# Patient Record
(demographics unavailable — no encounter records)

---

## 2024-12-12 NOTE — ASSESSMENT
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 We briefly discussed option of repeat in office injection with Restylane, in OR injection with Prolaryn Plus, or medialization thyroplasty. As he states his voice is much better subsequent to injection in office and has maintained this improvement will hold off on any of these options for now.   As head and neck exam is normal no indication for CT maxillofacial.  Small non-displaced fracture of left lateral max sinus wall will heal without surgical intervention. No indication for abx.

## 2024-12-12 NOTE — PROCEDURE
[de-identified] : Stroboscopic Laryngoscopy Procedure Note: Indication: Assess laryngeal biomechanics and vocal fold oscillation. Description of Procedure: Informed consent was verbally obtained from the patient prior to the procedure. The patient was seated in the clinic chair. Topical anesthesia was achieved by first spraying the nasal cavities with 4% lidocaine and nasal decongestant.  Findings: Supraglottis: no masses or lesions Glottis: Structure:  Right: small mid fold polyp  Left: crisp and shows no lesions or masses  Mobility:  Right: normal  Left: normal  Amplitude:  Right: increased  Left: increased  Closure: complete  Wave symmetry: symmetric Subglottis: no masses or lesions within the visualized subglottis Visualized airway is widely patent.

## 2024-12-12 NOTE — PROCEDURE
[de-identified] : Stroboscopic Laryngoscopy Procedure Note: Indication: Assess laryngeal biomechanics and vocal fold oscillation. Description of Procedure: Informed consent was verbally obtained from the patient prior to the procedure. The patient was seated in the clinic chair. Topical anesthesia was achieved by first spraying the nasal cavities with 4% lidocaine and nasal decongestant.  Findings: Supraglottis: no masses or lesions Glottis: Structure:  Right: small mid fold polyp  Left: crisp and shows no lesions or masses  Mobility:  Right: normal  Left: normal  Amplitude:  Right: increased  Left: increased  Closure: complete  Wave symmetry: symmetric Subglottis: no masses or lesions within the visualized subglottis Visualized airway is widely patent.

## 2024-12-12 NOTE — PHYSICAL EXAM
[Midline] : trachea located in midline position [Normal] : no masses and lesions seen, face is symmetric

## 2024-12-12 NOTE — HISTORY OF PRESENT ILLNESS
[de-identified] : LAVINIA TOWNSEND is a 70 year old male who presents to the [facility] for follow up of his lower motor neuron disease, glottic insufficiency- severe, dysphonia, and hx of PNA- possible aspiration. I last saw the patient on 9/12/24. Was to continue working with SLP and RTC in 3mths. Continues to see Blanca Fields for speech. Voice continues to improve, however still hoarse. No trouble swallowing or breathing.   CT head performed on 12/9/24 and reviewed by myself shows: 1. Age-appropriate involutional changes. No acute intracranial hemorrhage, mass effect, or midline shift. 2. Air-fluid level within the left maxillary sinus, possibly hemorrhage. Non-displaced fracture along the lateral wall of the left maxillary sinus.  Previously reported: difficulty phonating for 2 years. Has been worse past 6 months but since then plateaued. Had recent admission for colitis and hypercarbia resp failure secondary to PNA June 16-28th in [place]. July 28-July 4th [place] rehab. Previously did voice therapy completing on May 2023. Hx of being  during 9/11. Recent hospitalization did not require intubation. He now does not note normal periods of voicing. He does not note difficulty swallowing. He does note frequent heartburn symptoms - pantoprazole daily He does note difficulty breahting during activity - using Breo Ellipta daily and non invasive ventilator at night since hospitalization. PMH significant for lower motor neuron disease. Sees outside neurologist.  PET (8/15/24): 1. Normal gastric uptake. 2. Uptake previously seen at the bowel has resolved. 3. Stable mildly avid lingular opacity. Clusters of small RIGHT LOWER lobe lung nodules appear largely unchanged since most recent dedicated CT and are metabolically unremarkable. Attention to follow-up. 4. No new suspicious findings otherwise noted  CT chest w/o con performed on 7/22/24 shows: Resolved bilateral lower lobe opacities since 6/28/2024. Persistent clusters of small nodules in the right lower lobe, likely related to a small airways process.  Modified barium swallow performed on 6/21/24 and reviewed by myself shows: No obvious aspiration. No CP bar. No Zenkers. No significant residue.  Prior Pertinent Procedures: 9/12/24: In office bilateral injection laryngoplasty with Restylane; Dr. Champagne

## 2024-12-12 NOTE — HISTORY OF PRESENT ILLNESS
[de-identified] : LAVINIA TOWNSEND is a 70 year old male who presents to the [facility] for follow up of his lower motor neuron disease, glottic insufficiency- severe, dysphonia, and hx of PNA- possible aspiration. I last saw the patient on 9/12/24. Was to continue working with SLP and RTC in 3mths. Continues to see Blanca Fields for speech. Voice continues to improve, however still hoarse. No trouble swallowing or breathing.   CT head performed on 12/9/24 and reviewed by myself shows: 1. Age-appropriate involutional changes. No acute intracranial hemorrhage, mass effect, or midline shift. 2. Air-fluid level within the left maxillary sinus, possibly hemorrhage. Non-displaced fracture along the lateral wall of the left maxillary sinus.  Previously reported: difficulty phonating for 2 years. Has been worse past 6 months but since then plateaued. Had recent admission for colitis and hypercarbia resp failure secondary to PNA June 16-28th in [place]. July 28-July 4th [place] rehab. Previously did voice therapy completing on May 2023. Hx of being  during 9/11. Recent hospitalization did not require intubation. He now does not note normal periods of voicing. He does not note difficulty swallowing. He does note frequent heartburn symptoms - pantoprazole daily He does note difficulty breahting during activity - using Breo Ellipta daily and non invasive ventilator at night since hospitalization. PMH significant for lower motor neuron disease. Sees outside neurologist.  PET (8/15/24): 1. Normal gastric uptake. 2. Uptake previously seen at the bowel has resolved. 3. Stable mildly avid lingular opacity. Clusters of small RIGHT LOWER lobe lung nodules appear largely unchanged since most recent dedicated CT and are metabolically unremarkable. Attention to follow-up. 4. No new suspicious findings otherwise noted  CT chest w/o con performed on 7/22/24 shows: Resolved bilateral lower lobe opacities since 6/28/2024. Persistent clusters of small nodules in the right lower lobe, likely related to a small airways process.  Modified barium swallow performed on 6/21/24 and reviewed by myself shows: No obvious aspiration. No CP bar. No Zenkers. No significant residue.  Prior Pertinent Procedures: 9/12/24: In office bilateral injection laryngoplasty with Restylane; Dr. Champagne

## 2024-12-13 NOTE — HISTORY OF PRESENT ILLNESS
[FreeTextEntry8] : Patient presents to office after having suffered a mechanical fall while at home. He was evaluated at [place] [facility] with a CT scan of the head which revealed a left maxillary sinus  lateral wall fracture.  Patient saw Dr. Martinez and ENT consultation. He saw his pulmonologist this morning and because of left lateral wall pain he was sent for an x-ray He denies any cough, hemoptysis, shortness of breath.  He denies any urinary symptoms, hematuria

## 2024-12-13 NOTE — PHYSICAL EXAM
[No Acute Distress] : no acute distress [Normal Sclera/Conjunctiva] : normal sclera/conjunctiva [No Lymphadenopathy] : no lymphadenopathy [Thyroid Normal, No Nodules] : the thyroid was normal and there were no nodules present [No Abdominal Bruit] : a ~M bruit was not heard ~T in the abdomen [No Edema] : there was no peripheral edema [No Palpable Aorta] : no palpable aorta [Normal] : soft, non-tender, non-distended, no masses palpated, no HSM and normal bowel sounds [Normal Supraclavicular Nodes] : no supraclavicular lymphadenopathy [Normal Posterior Cervical Nodes] : no posterior cervical lymphadenopathy [Normal Anterior Cervical Nodes] : no anterior cervical lymphadenopathy [No CVA Tenderness] : no CVA  tenderness [No Spinal Tenderness] : no spinal tenderness [No Rash] : no rash

## 2025-03-10 NOTE — PROCEDURE
[de-identified] : Laryngoscopy: Stroboscopic Laryngoscopy Procedure Note: Indication: Assess laryngeal biomechanics and vocal fold oscillation. Description of Procedure: Informed consent was verbally obtained from the patient prior to the procedure. The patient was seated in the clinic chair. Topical anesthesia was achieved by first spraying the nasal cavities with 4% lidocaine and nasal decongestant.  Findings: Supraglottis: no masses or lesions Glottis: Structure:  Right: small mid fold polyp  Left: crisp and shows no lesions or masses  Mobility:  Right: normal  Left: normal  Amplitude:  Right: increased  Left: increased  Closure: complete  Wave symmetry: symmetric Subglottis: no masses or lesions within the visualized subglottis Visualized airway is widely patent.

## 2025-03-10 NOTE — HISTORY OF PRESENT ILLNESS
[de-identified] : LAVINIA TOWNSEND is a 70 year old male who presents to the [facility] for follow up of his lower motor neuron disease, glottic insufficiency- severe, dysphonia, small right vocal fold polyp and hx of PNA- possible aspiration. I last saw the patient on 12/12/24. Pt was to follow up in 3mths for re-evaluation. Voice has been improved, louder and stronger. 80% normal voice. No recent PNA. Has COPD, but breathing is stable. No trouble swallowing.  Have completed voice therapy.   CT head performed on 12/9/24 and reviewed by myself shows: 1. Age-appropriate involutional changes. No acute intracranial hemorrhage, mass effect, or midline shift. 2. Air-fluid level within the left maxillary sinus, possibly hemorrhage. Non-displaced fracture along the lateral wall of the left maxillary sinus.  Previously reported: difficulty phonating for 2 years. Has been worse past 6 months but since then plateaued. Had recent admission for colitis and hypercarbia resp failure secondary to PNA June 16-28th in [place]. July 28-July 4th [place] re[facility]. Previously did voice therapy completing on May 2023. Hx of being  during 9/11. Recent hospitalization did not require intubation. He now does not note normal periods of voicing. He does not note difficulty swallowing. He does note frequent heartburn symptoms - pantoprazole daily He does note difficulty breahting during activity - using Breo Ellipta daily and non invasive ventilator at night since hospitalization. PMH significant for lower motor neuron disease. Sees outside neurologist.  PET (8/15/24): 1. Normal gastric uptake. 2. Uptake previously seen at the bowel has resolved. 3. Stable mildly avid lingular opacity. Clusters of small RIGHT LOWER lobe lung nodules appear largely unchanged since most recent dedicated CT and are metabolically unremarkable. Attention to follow-up. 4. No new suspicious findings otherwise noted  CT chest w/o con performed on 7/22/24 shows: Resolved bilateral lower lobe opacities since 6/28/2024. Persistent clusters of small nodules in the right lower lobe, likely related to a small airways process.  Modified barium swallow performed on 6/21/24 and reviewed by myself shows: No obvious aspiration. No CP bar. No Zenkers. No significant residue.  Prior Pertinent Procedures: 9/12/24: In office bilateral injection laryngoplasty with Restylane; Dr. Champagne

## 2025-03-10 NOTE — PROCEDURE
[de-identified] : Laryngoscopy: Stroboscopic Laryngoscopy Procedure Note: Indication: Assess laryngeal biomechanics and vocal fold oscillation. Description of Procedure: Informed consent was verbally obtained from the patient prior to the procedure. The patient was seated in the clinic chair. Topical anesthesia was achieved by first spraying the nasal cavities with 4% lidocaine and nasal decongestant.  Findings: Supraglottis: no masses or lesions Glottis: Structure:  Right: small mid fold polyp  Left: crisp and shows no lesions or masses  Mobility:  Right: normal  Left: normal  Amplitude:  Right: increased  Left: increased  Closure: complete  Wave symmetry: symmetric Subglottis: no masses or lesions within the visualized subglottis Visualized airway is widely patent.

## 2025-03-10 NOTE — HISTORY OF PRESENT ILLNESS
[de-identified] : LAVINIA TOWNSEND is a 70 year old male who presents to the [facility] for follow up of his lower motor neuron disease, glottic insufficiency- severe, dysphonia, small right vocal fold polyp and hx of PNA- possible aspiration. I last saw the patient on 12/12/24. Pt was to follow up in 3mths for re-evaluation. Voice has been improved, louder and stronger. 80% normal voice. No recent PNA. Has COPD, but breathing is stable. No trouble swallowing.  Have completed voice therapy.   CT head performed on 12/9/24 and reviewed by myself shows: 1. Age-appropriate involutional changes. No acute intracranial hemorrhage, mass effect, or midline shift. 2. Air-fluid level within the left maxillary sinus, possibly hemorrhage. Non-displaced fracture along the lateral wall of the left maxillary sinus.  Previously reported: difficulty phonating for 2 years. Has been worse past 6 months but since then plateaued. Had recent admission for colitis and hypercarbia resp failure secondary to PNA June 16-28th in [place]. July 28-July 4th [place] re[facility]. Previously did voice therapy completing on May 2023. Hx of being  during 9/11. Recent hospitalization did not require intubation. He now does not note normal periods of voicing. He does not note difficulty swallowing. He does note frequent heartburn symptoms - pantoprazole daily He does note difficulty breahting during activity - using Breo Ellipta daily and non invasive ventilator at night since hospitalization. PMH significant for lower motor neuron disease. Sees outside neurologist.  PET (8/15/24): 1. Normal gastric uptake. 2. Uptake previously seen at the bowel has resolved. 3. Stable mildly avid lingular opacity. Clusters of small RIGHT LOWER lobe lung nodules appear largely unchanged since most recent dedicated CT and are metabolically unremarkable. Attention to follow-up. 4. No new suspicious findings otherwise noted  CT chest w/o con performed on 7/22/24 shows: Resolved bilateral lower lobe opacities since 6/28/2024. Persistent clusters of small nodules in the right lower lobe, likely related to a small airways process.  Modified barium swallow performed on 6/21/24 and reviewed by myself shows: No obvious aspiration. No CP bar. No Zenkers. No significant residue.  Prior Pertinent Procedures: 9/12/24: In office bilateral injection laryngoplasty with Restylane; Dr. Champagne

## 2025-03-10 NOTE — ASSESSMENT
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 Has been doing very well with voice thus far. Would consider possible medialization thyroplasty in the future should voice worsen again.

## 2025-03-19 NOTE — CARDIOLOGY SUMMARY
[de-identified] : NSR [de-identified] : april 2022\par  Nuclear \par  nml perfusion [de-identified] : april 2022\par  EF 60-65%\par  concentric LV remodeling [Normal] : normal [No Ischemia] : no Ischemia [___] : [unfilled] [LVEF ___%] : LVEF [unfilled]% [None] : no pulmonary hypertension [Enlarged] : enlarged LA size [Mild] : mild mitral regurgitation

## 2025-03-19 NOTE — REASON FOR VISIT
[CV Risk Factors and Non-Cardiac Disease] : CV risk factors and non-cardiac disease [Coronary Artery Disease] : coronary artery disease [Follow-Up - Clinic] : a clinic follow-up of [Dizziness] : dizziness

## 2025-03-19 NOTE — DISCUSSION/SUMMARY
[FreeTextEntry1] : This is a 70 year old man with nonobstructive peripheral vascular disease, coronary artery calcifications, and GERD who presents for a cardiac evaluation.   He was noted to have coronary calcifications  on a recent CT, and was asked to follow with me.  His LE edema has resolved.  He is now off of PO Lasix, and will stay off of it.  His blood pressure is controlled.    Prior echocardiogram and stress testing from 2022 were overall unremarkable.  We discussed the benefits of a healthy diet, regular exercise and physical activity, and weight management in detail.  He will continue N IPPV, and will follow with neurology for his lower motor neuron disease.   He will continue ASA for primary prevention-coronary calcifications.  He will follow up again in 6 months.  [EKG obtained to assist in diagnosis and management of assessed problem(s)] : EKG obtained to assist in diagnosis and management of assessed problem(s)

## 2025-03-19 NOTE — PHYSICAL EXAM
[Well Developed] : well developed [Well Nourished] : well nourished [No Acute Distress] : no acute distress [Normal Venous Pressure] : normal venous pressure [No Carotid Bruit] : no carotid bruit [Normal S1, S2] : normal S1, S2 [No Rub] : no rub [Clear Lung Fields] : clear lung fields [Good Air Entry] : good air entry [No Respiratory Distress] : no respiratory distress  [Soft] : abdomen soft [Non Tender] : non-tender [No Masses/organomegaly] : no masses/organomegaly [Normal Bowel Sounds] : normal bowel sounds [Normal Gait] : normal gait [No Cyanosis] : no cyanosis [No Clubbing] : no clubbing [No Varicosities] : no varicosities [No Rash] : no rash [No Skin Lesions] : no skin lesions [Moves all extremities] : moves all extremities [No Focal Deficits] : no focal deficits [Normal Speech] : normal speech [Alert and Oriented] : alert and oriented [Normal memory] : normal memory [de-identified] : Mild ankle edema [Normal Appearance] : normal appearance [General Appearance - In No Acute Distress] : no acute distress [Normal Conjunctiva] : the conjunctiva exhibited no abnormalities [Normal Oral Mucosa] : normal oral mucosa [Normal Jugular Venous V Waves Present] : normal jugular venous V waves present [FreeTextEntry1] : No JVD [Respiration, Rhythm And Depth] : normal respiratory rhythm and effort [Exaggerated Use Of Accessory Muscles For Inspiration] : no accessory muscle use [Auscultation Breath Sounds / Voice Sounds] : lungs were clear to auscultation bilaterally [Not Palpable] : not palpable [No Precordial Heave] : no precordial heave was noted [Normal Rate] : normal [Rhythm Regular] : regular [Normal S1] : normal S1 [Normal S2] : normal S2 [No Gallop] : no gallop heard [No Murmur] : no murmurs heard [Right Carotid Bruit] : no bruit heard over the right carotid [Left Carotid Bruit] : no bruit heard over the left carotid [2+] : left 2+ [No Abnormalities] : the abdominal aorta was not enlarged and no bruit was heard [Bruit] : no bruit heard [No Pitting Edema] : no pitting edema present [Bowel Sounds] : normal bowel sounds [Abdomen Soft] : soft [Abdomen Tenderness] : non-tender [Abnormal Walk] : normal gait [Gait - Sufficient For Exercise Testing] : the gait was sufficient for exercise testing [Nail Clubbing] : no clubbing of the fingernails [Cyanosis, Localized] : no localized cyanosis [Petechial Hemorrhages (___cm)] : no petechial hemorrhages [Skin Color & Pigmentation] : normal skin color and pigmentation [] : no rash [No Venous Stasis] : no venous stasis [Skin Lesions] : no skin lesions [Oriented To Time, Place, And Person] : oriented to person, place, and time [Affect] : the affect was normal [Mood] : the mood was normal [No Anxiety] : not feeling anxious

## 2025-03-19 NOTE — HISTORY OF PRESENT ILLNESS
[FreeTextEntry1] : This is a 70 year old man with nonobstructive peripheral vascular disease, coronary artery calcifications, and GERD who presents for a cardiac evaluation.   He was noted to have coronary calcifications  on a recent CT 2022.  He also has a lower motor neuron disease, and is following with neurology.        He was admitted to  in July if 2024.  He had colitis and hypercarbic resp failure secondary to PNA.  He was not volume overloaded, and was briefly in the ICU.   He was sent to rehab, then did home PT.  He is now going to outpatient PT.  He is using NIPPV at night to sleep.  He is now off of Lasix.  No signs or symptoms of volume overload.  No chest pain, increasing dyspnea, PND, orthopnea, dizziness, or syncope.

## 2025-06-09 NOTE — ASSESSMENT
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  I would consider in office injection laryngoplasty with prolaryn Plus should this future injection not last long.  spouse

## 2025-06-09 NOTE — PROCEDURE
[de-identified] : Stroboscopic Laryngoscopy Procedure Note:  Indication:	Assess laryngeal biomechanics and vocal fold oscillation.  Description of Procedure:	Informed consent was verbally obtained from the patient prior to the procedure. The patient was seated in the clinic chair. Topical anesthesia was achieved by first spraying the nasal cavities with 4% lidocaine and nasal decongestant.   Findings:  Supraglottis: no masses or lesions  Glottis:    Structure:                        Right: crisp and shows no lesions or masses                        Left:  crisp and shows no lesions or masses                 Mobility:                        Right:  normal                        Left:  normal                Amplitude:                        Right:  increased                       Left:  increased                Closure: incomplete                 Wave symmetry:  symmetric  Subglottis: no masses or lesions within the visualized subglottis Visualized airway is widely patent. Other: severe supraglottic squeeze on phonation

## 2025-06-09 NOTE — HISTORY OF PRESENT ILLNESS
[de-identified] :  LAVINIA TOWNSEND is a 70 year old male who presents to the [facility] for follow up of his lower motor neuron disease, glottic insufficiency- severe, dysphonia, small right vocal fold polyp and hx of PNA- possible aspiration. I last saw the patient on 3/10/25. Pt was to follow up in 3mths. Would consider medialization if voice worsened again. Voice is getting weaker. Breathing is getting worse. Sleeps with non-invasive ventilator. Feels his swallowing is getting worse. Feels weaker over the past few days as well.   CT head performed on 12/9/24 and reviewed by myself shows: 1. Age-appropriate involutional changes. No acute intracranial hemorrhage, mass effect, or midline shift. 2. Air-fluid level within the left maxillary sinus, possibly hemorrhage. Non-displaced fracture along the lateral wall of the left maxillary sinus.  Previously reported: difficulty phonating for 2 years. Has been worse past 6 months but since then plateaued. Had recent admission for colitis and hypercarbia resp failure secondary to PNA June 16-28th in [place]. July 28-July 4th [place] rehab. Previously did voice therapy completing on May 2023. Hx of being  during 9/11. Recent hospitalization did not require intubation. He now does not note normal periods of voicing. He does not note difficulty swallowing. He does note frequent heartburn symptoms - pantoprazole daily He does note difficulty breahting during activity - using Breo Ellipta daily and non invasive ventilator at night since hospitalization. PMH significant for lower motor neuron disease. Sees outside neurologist.  PET (8/15/24): 1. Normal gastric uptake. 2. Uptake previously seen at the bowel has resolved. 3. Stable mildly avid lingular opacity. Clusters of small RIGHT LOWER lobe lung nodules appear largely unchanged since most recent dedicated CT and are metabolically unremarkable. Attention to follow-up. 4. No new suspicious findings otherwise noted  CT chest w/o con performed on 7/22/24 shows: Resolved bilateral lower lobe opacities since 6/28/2024. Persistent clusters of small nodules in the right lower lobe, likely related to a small airways process.  Modified barium swallow performed on 6/21/24 and reviewed by myself shows: No obvious aspiration. No CP bar. No Zenkers. No significant residue.  Prior Pertinent Procedures: 9/12/24: In office bilateral injection laryngoplasty with Restylane; Dr. Champagne

## 2025-06-09 NOTE — ASSESSMENT
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  I would consider in office injection laryngoplasty with prolaryn Plus should this future injection not last long.

## 2025-06-09 NOTE — PROCEDURE
[de-identified] : Stroboscopic Laryngoscopy Procedure Note:  Indication:	Assess laryngeal biomechanics and vocal fold oscillation.  Description of Procedure:	Informed consent was verbally obtained from the patient prior to the procedure. The patient was seated in the clinic chair. Topical anesthesia was achieved by first spraying the nasal cavities with 4% lidocaine and nasal decongestant.   Findings:  Supraglottis: no masses or lesions  Glottis:    Structure:                        Right: crisp and shows no lesions or masses                        Left:  crisp and shows no lesions or masses                 Mobility:                        Right:  normal                        Left:  normal                Amplitude:                        Right:  increased                       Left:  increased                Closure: incomplete                 Wave symmetry:  symmetric  Subglottis: no masses or lesions within the visualized subglottis Visualized airway is widely patent. Other: severe supraglottic squeeze on phonation

## 2025-06-09 NOTE — HISTORY OF PRESENT ILLNESS
[de-identified] :  LAVINIA TOWNSEND is a 70 year old male who presents to the [facility] for follow up of his lower motor neuron disease, glottic insufficiency- severe, dysphonia, small right vocal fold polyp and hx of PNA- possible aspiration. I last saw the patient on 3/10/25. Pt was to follow up in 3mths. Would consider medialization if voice worsened again. Voice is getting weaker. Breathing is getting worse. Sleeps with non-invasive ventilator. Feels his swallowing is getting worse. Feels weaker over the past few days as well.   CT head performed on 12/9/24 and reviewed by myself shows: 1. Age-appropriate involutional changes. No acute intracranial hemorrhage, mass effect, or midline shift. 2. Air-fluid level within the left maxillary sinus, possibly hemorrhage. Non-displaced fracture along the lateral wall of the left maxillary sinus.  Previously reported: difficulty phonating for 2 years. Has been worse past 6 months but since then plateaued. Had recent admission for colitis and hypercarbia resp failure secondary to PNA June 16-28th in [place]. July 28-July 4th [place] [facility]. Previously did voice therapy completing on May 2023. Hx of being  during 9/11. Recent hospitalization did not require intubation. He now does not note normal periods of voicing. He does not note difficulty swallowing. He does note frequent heartburn symptoms - pantoprazole daily He does note difficulty breahting during activity - using Breo Ellipta daily and non invasive ventilator at night since hospitalization. PMH significant for lower motor neuron disease. Sees outside neurologist.  PET (8/15/24): 1. Normal gastric uptake. 2. Uptake previously seen at the bowel has resolved. 3. Stable mildly avid lingular opacity. Clusters of small RIGHT LOWER lobe lung nodules appear largely unchanged since most recent dedicated CT and are metabolically unremarkable. Attention to follow-up. 4. No new suspicious findings otherwise noted  CT chest w/o con performed on 7/22/24 shows: Resolved bilateral lower lobe opacities since 6/28/2024. Persistent clusters of small nodules in the right lower lobe, likely related to a small airways process.  Modified barium swallow performed on 6/21/24 and reviewed by myself shows: No obvious aspiration. No CP bar. No Zenkers. No significant residue.  Prior Pertinent Procedures: 9/12/24: In office bilateral injection laryngoplasty with Restylane; Dr. Champagne

## 2025-06-19 NOTE — ASSESSMENT
[FreeTextEntry1] : Assessment/Plan: #1 Lower motor neuron disease #2 Glottic insufficiency #3 Dysphonia #4 History of PNA- possible aspiration  He was again offered the followin) Observation 2) In office injection laryngoplasty with Restylane 3) In office injection laryngoplasty with Prolaryn Plus 4) Bilateral medialization thyroplasty  He elected for option #2 as his last injection lasted for 9 months and we performed this today. I would consider in office injection laryngoplasty with prolaryn Plus should this future injection not last long.  I would like to see him back in 3 months.

## 2025-06-19 NOTE — HISTORY OF PRESENT ILLNESS
[de-identified] :  LAVINIA TOWNSEND is a 70 year old male who presents to the [facility] for follow up of his lower motor neuron disease, glottic insufficiency- severe, dysphonia and hx of PNA- possible aspiration. I last saw the patient on 6/9/25. Pt returns for injection laryngoplasty. States voice significantly improved after the last injection- lasting for about 6 months.  Raspy, whisper voice has returned for the past 2 weeks  Tolerating regular diet with no s/s of aspiration. No recent PNA  Denies recent infections, globus sensation, pain while voicing, cough, pain while swallowing, neck swelling, neck pain and complete loss of voice   CT head performed on 12/9/24 and reviewed by myself shows: 1. Age-appropriate involutional changes. No acute intracranial hemorrhage, mass effect, or midline shift. 2. Air-fluid level within the left maxillary sinus, possibly hemorrhage. Non-displaced fracture along the lateral wall of the left maxillary sinus.  Previously reported: difficulty phonating for 2 years. Has been worse past 6 months but since then plateaued. Had recent admission for colitis and hypercarbia resp failure secondary to PNA June 16-28th in [place]. July 28-July 4th [place] [facility]. Previously did voice therapy completing on May 2023. Hx of being  during 9/11. Recent hospitalization did not require intubation. He now does not note normal periods of voicing. He does not note difficulty swallowing. He does note frequent heartburn symptoms - pantoprazole daily He does note difficulty breahting during activity - using Breo Ellipta daily and non invasive ventilator at night since hospitalization. PMH significant for lower motor neuron disease. Sees outside neurologist.  PET (8/15/24): 1. Normal gastric uptake. 2. Uptake previously seen at the bowel has resolved. 3. Stable mildly avid lingular opacity. Clusters of small RIGHT LOWER lobe lung nodules appear largely unchanged since most recent dedicated CT and are metabolically unremarkable. Attention to follow-up. 4. No new suspicious findings otherwise noted  CT chest w/o con performed on 7/22/24 shows: Resolved bilateral lower lobe opacities since 6/28/2024. Persistent clusters of small nodules in the right lower lobe, likely related to a small airways process.  Modified barium swallow performed on 6/21/24 and reviewed by myself shows: No obvious aspiration. No CP bar. No Zenkers. No significant residue.  Prior Pertinent Procedures: 9/12/24: In office bilateral injection laryngoplasty with Restylane; Dr. Champagne 6/19/25:  In office bilateral injection laryngoplasty with Restylane; Dr. Champagne

## 2025-06-19 NOTE — HISTORY OF PRESENT ILLNESS
[de-identified] :  LAVINIA TOWNSEND is a 70 year old male who presents to the [facility] for follow up of his lower motor neuron disease, glottic insufficiency- severe, dysphonia and hx of PNA- possible aspiration. I last saw the patient on 6/9/25. Pt returns for injection laryngoplasty. States voice significantly improved after the last injection- lasting for about 6 months.  Raspy, whisper voice has returned for the past 2 weeks  Tolerating regular diet with no s/s of aspiration. No recent PNA  Denies recent infections, globus sensation, pain while voicing, cough, pain while swallowing, neck swelling, neck pain and complete loss of voice   CT head performed on 12/9/24 and reviewed by myself shows: 1. Age-appropriate involutional changes. No acute intracranial hemorrhage, mass effect, or midline shift. 2. Air-fluid level within the left maxillary sinus, possibly hemorrhage. Non-displaced fracture along the lateral wall of the left maxillary sinus.  Previously reported: difficulty phonating for 2 years. Has been worse past 6 months but since then plateaued. Had recent admission for colitis and hypercarbia resp failure secondary to PNA June 16-28th in [place]. July 28-July 4th [facility]. Previously did voice therapy completing on May 2023. Hx of being  during 9/11. Recent hospitalization did not require intubation. He now does not note normal periods of voicing. He does not note difficulty swallowing. He does note frequent heartburn symptoms - pantoprazole daily He does note difficulty breahting during activity - using Breo Ellipta daily and non invasive ventilator at night since hospitalization. PMH significant for lower motor neuron disease. Sees outside neurologist.  PET (8/15/24): 1. Normal gastric uptake. 2. Uptake previously seen at the bowel has resolved. 3. Stable mildly avid lingular opacity. Clusters of small RIGHT LOWER lobe lung nodules appear largely unchanged since most recent dedicated CT and are metabolically unremarkable. Attention to follow-up. 4. No new suspicious findings otherwise noted  CT chest w/o con performed on 7/22/24 shows: Resolved bilateral lower lobe opacities since 6/28/2024. Persistent clusters of small nodules in the right lower lobe, likely related to a small airways process.  Modified barium swallow performed on 6/21/24 and reviewed by myself shows: No obvious aspiration. No CP bar. No Zenkers. No significant residue.  Prior Pertinent Procedures: 9/12/24: In office bilateral injection laryngoplasty with Restylane; Dr. Champagne 6/19/25:  In office bilateral injection laryngoplasty with Restylane; Dr. Champagne

## 2025-06-19 NOTE — PROCEDURE
[FreeTextEntry3] : Restylane injection of bilateral vocal folds: SURGEON: Phillip Champagne MD   Substance Injected: Restylane   Amounts and Location Injected: 1.2 ml injected into the bilateral paraglottic space   FINDINGS: Glottic insufficiency   NARRATIVE: The patient was brought to clinic and consented for the procedure. Their nose was topicalized with oxymetazoline nasal spray and 2% lidocaine.   After topicalization, a surgical pause was held to confirm correct patient, procedure, site, allergies, equipment, and position. Using a 27-gauge needle, approximately 2cc of 2% lidocaine in 1:100,000 epinephrine was injected subcutaneously at the thyroid prominence.   A flexible distal-chip video laryngoscope was introduced into the right nare. The scope was positioned above the epiglottis. Approximately 4ml of 4% lidocaine was dripped onto the vocal cords through the working channel of the scope. This produced a strong cough, anesthetizing the vocal folds. After allowing several minutes for analgesia, the 1 ml of Restylane on a 25-gauge needle (that was bent appropriately) was inserted into the skin that had previously been anesthetized just superior to the thyroid notch. The tip of the needle was seen tenting up the mucosa of the petiole just above the anterior commissure. The needle then pierced through this mucosa and was advanced and inserted laterally into the posterior 1/3 of the bilateral vocal folds. The patient was asked to phonate, and they had a squeezed quality as aimed for. The vocal fold had been pushed slightly past medial position as intended. The needle and flexible laryngoscopes were removed. The patient tolerated the procedure well.   DISPOSITION: The patient was discharged with instructions not to eat or drink for about 60 minutes to allow the topical anesthesia to wear off.

## 2025-06-19 NOTE — HISTORY OF PRESENT ILLNESS
[de-identified] :  LAVINIA TOWNSEND is a 70 year old male who presents to the [facility] for follow up of his lower motor neuron disease, glottic insufficiency- severe, dysphonia and hx of PNA- possible aspiration. I last saw the patient on 6/9/25. Pt returns for injection laryngoplasty. States voice significantly improved after the last injection- lasting for about 6 months.  Raspy, whisper voice has returned for the past 2 weeks  Tolerating regular diet with no s/s of aspiration. No recent PNA  Denies recent infections, globus sensation, pain while voicing, cough, pain while swallowing, neck swelling, neck pain and complete loss of voice   CT head performed on 12/9/24 and reviewed by myself shows: 1. Age-appropriate involutional changes. No acute intracranial hemorrhage, mass effect, or midline shift. 2. Air-fluid level within the left maxillary sinus, possibly hemorrhage. Non-displaced fracture along the lateral wall of the left maxillary sinus.  Previously reported: difficulty phonating for 2 years. Has been worse past 6 months but since then plateaued. Had recent admission for colitis and hypercarbia resp failure secondary to PNA June 16-28th in [place]. July 28-July 4th [place] [facility]. Previously did voice therapy completing on May 2023. Hx of being  during 9/11. Recent hospitalization did not require intubation. He now does not note normal periods of voicing. He does not note difficulty swallowing. He does note frequent heartburn symptoms - pantoprazole daily He does note difficulty breahting during activity - using Breo Ellipta daily and non invasive ventilator at night since hospitalization. PMH significant for lower motor neuron disease. Sees outside neurologist.  PET (8/15/24): 1. Normal gastric uptake. 2. Uptake previously seen at the bowel has resolved. 3. Stable mildly avid lingular opacity. Clusters of small RIGHT LOWER lobe lung nodules appear largely unchanged since most recent dedicated CT and are metabolically unremarkable. Attention to follow-up. 4. No new suspicious findings otherwise noted  CT chest w/o con performed on 7/22/24 shows: Resolved bilateral lower lobe opacities since 6/28/2024. Persistent clusters of small nodules in the right lower lobe, likely related to a small airways process.  Modified barium swallow performed on 6/21/24 and reviewed by myself shows: No obvious aspiration. No CP bar. No Zenkers. No significant residue.  Prior Pertinent Procedures: 9/12/24: In office bilateral injection laryngoplasty with Restylane; Dr. Champagne 6/19/25:  In office bilateral injection laryngoplasty with Restylane; Dr. Champagne

## 2025-07-03 NOTE — PROCEDURE
[de-identified] : Laryngoscopy: Stroboscopic Laryngoscopy Procedure Note: Indication: Assess laryngeal biomechanics and vocal fold oscillation. Description of Procedure: Informed consent was verbally obtained from the patient prior to the procedure. The patient was seated in the clinic chair. Topical anesthesia was achieved by first spraying the nasal cavities with 4% lidocaine and nasal decongestant.  Findings: Supraglottis: no masses or lesions Glottis: Structure:  Right: crisp and shows no lesions or masses  Left: crisp and shows no lesions or masses, mild ecchymosis near ventricle  Mobility:  Right: normal  Left: normal  Amplitude:  Right: increased  Left: decreased  Closure: complete  Wave symmetry: asymmetric Subglottis: no masses or lesions within the visualized subglottis Visualized airway is widely patent. Other: severe supraglottic squeeze on phonation.

## 2025-07-03 NOTE — HISTORY OF PRESENT ILLNESS
[de-identified] : LAVINIA TOWNSEND is a 70 year old male who presents to the [facility] for follow up of his lower motor neuron disease, glottic insufficiency- severe, dysphonia and hx of PNA- possible aspiration. I last saw the patient on 6/19/25. Pt had injection laryngoplasty at last visit but returns early due to not having any benefit from this. His previous injection lasted for 9 months. States voice is the same since the injection about 2 weeks ago, no improvement. States breathing and swallowing is the same. Doesn't have a great appetite but is doing solids and liquids by mouth. Denies fevers/chills, throat pain, and otalgia.  CT head performed on 12/9/24 and reviewed by myself shows: 1. Age-appropriate involutional changes. No acute intracranial hemorrhage, mass effect, or midline shift. 2. Air-fluid level within the left maxillary sinus, possibly hemorrhage. Non-displaced fracture along the lateral wall of the left maxillary sinus.  Previously reported: difficulty phonating for 2 years. Has been worse past 6 months but since then plateaued. Had recent admission for colitis and hypercarbia resp failure secondary to PNA June 16-28th in [place]. July 28-July 4th [place] [facility]. Previously did voice therapy completing on May 2023. Hx of being  during 9/11. Recent hospitalization did not require intubation. He now does not note normal periods of voicing. He does not note difficulty swallowing. He does note frequent heartburn symptoms - pantoprazole daily He does note difficulty breahting during activity - using Breo Ellipta daily and non invasive ventilator at night since hospitalization. PMH significant for lower motor neuron disease. Sees outside neurologist.  PET (8/15/24): 1. Normal gastric uptake. 2. Uptake previously seen at the bowel has resolved. 3. Stable mildly avid lingular opacity. Clusters of small RIGHT LOWER lobe lung nodules appear largely unchanged since most recent dedicated CT and are metabolically unremarkable. Attention to follow-up. 4. No new suspicious findings otherwise noted  CT chest w/o con performed on 7/22/24 shows: Resolved bilateral lower lobe opacities since 6/28/2024. Persistent clusters of small nodules in the right lower lobe, likely related to a small airways process.  Modified barium swallow performed on 6/21/24 and reviewed by myself shows: No obvious aspiration. No CP bar. No Zenkers. No significant residue.  Prior Pertinent Procedures: 9/12/24: In office bilateral injection laryngoplasty with Restylane; Dr. Champagne 6/19/25: In office bilateral injection laryngoplasty with Restylane; Dr. Champagne

## 2025-07-03 NOTE — PROCEDURE
[de-identified] : Laryngoscopy: Stroboscopic Laryngoscopy Procedure Note: Indication: Assess laryngeal biomechanics and vocal fold oscillation. Description of Procedure: Informed consent was verbally obtained from the patient prior to the procedure. The patient was seated in the clinic chair. Topical anesthesia was achieved by first spraying the nasal cavities with 4% lidocaine and nasal decongestant.  Findings: Supraglottis: no masses or lesions Glottis: Structure:  Right: crisp and shows no lesions or masses  Left: crisp and shows no lesions or masses, mild ecchymosis near ventricle  Mobility:  Right: normal  Left: normal  Amplitude:  Right: increased  Left: decreased  Closure: complete  Wave symmetry: asymmetric Subglottis: no masses or lesions within the visualized subglottis Visualized airway is widely patent. Other: severe supraglottic squeeze on phonation.

## 2025-07-03 NOTE — ASSESSMENT
[FreeTextEntry1] : Assessment/Plan: #1 Lower motor neuron disease #2 Glottic insufficiency #3 Dysphonia #4 History of PNA- possible aspiration  It appears that majority of injection on right was absorbed or extruded and needs repeat injection on right.  As such we will schedule for right sided injection in 1 week. The risks, benefits, and alternatives to care were discussed with the patient and understanding expressed.

## 2025-07-03 NOTE — HISTORY OF PRESENT ILLNESS
[de-identified] : LAVINIA TOWNSEND is a 70 year old male who presents to the [facility] for follow up of his lower motor neuron disease, glottic insufficiency- severe, dysphonia and hx of PNA- possible aspiration. I last saw the patient on 6/19/25. Pt had injection laryngoplasty at last visit but returns early due to not having any benefit from this. His previous injection lasted for 9 months. States voice is the same since the injection about 2 weeks ago, no improvement. States breathing and swallowing is the same. Doesn't have a great appetite but is doing solids and liquids by mouth. Denies fevers/chills, throat pain, and otalgia.  CT head performed on 12/9/24 and reviewed by myself shows: 1. Age-appropriate involutional changes. No acute intracranial hemorrhage, mass effect, or midline shift. 2. Air-fluid level within the left maxillary sinus, possibly hemorrhage. Non-displaced fracture along the lateral wall of the left maxillary sinus.  Previously reported: difficulty phonating for 2 years. Has been worse past 6 months but since then plateaued. Had recent admission for colitis and hypercarbia resp failure secondary to PNA June 16-28th in [place]. July 28-July 4th [place] [facility]. Previously did voice therapy completing on May 2023. Hx of being  during 9/11. Recent hospitalization did not require intubation. He now does not note normal periods of voicing. He does not note difficulty swallowing. He does note frequent heartburn symptoms - pantoprazole daily He does note difficulty breahting during activity - using Breo Ellipta daily and non invasive ventilator at night since hospitalization. PMH significant for lower motor neuron disease. Sees outside neurologist.  PET (8/15/24): 1. Normal gastric uptake. 2. Uptake previously seen at the bowel has resolved. 3. Stable mildly avid lingular opacity. Clusters of small RIGHT LOWER lobe lung nodules appear largely unchanged since most recent dedicated CT and are metabolically unremarkable. Attention to follow-up. 4. No new suspicious findings otherwise noted  CT chest w/o con performed on 7/22/24 shows: Resolved bilateral lower lobe opacities since 6/28/2024. Persistent clusters of small nodules in the right lower lobe, likely related to a small airways process.  Modified barium swallow performed on 6/21/24 and reviewed by myself shows: No obvious aspiration. No CP bar. No Zenkers. No significant residue.  Prior Pertinent Procedures: 9/12/24: In office bilateral injection laryngoplasty with Restylane; Dr. Champagne 6/19/25: In office bilateral injection laryngoplasty with Restylane; Dr. Champagne

## 2025-07-10 NOTE — ASSESSMENT
[FreeTextEntry1] : Assessment/Plan: #1 Lower motor neuron disease #2 Glottic insufficiency #3 Dysphonia #4 History of PNA- possible aspiration  Patient to follow up in clinic in 2 weeks. If no good voice from todays injection would likely recommend voice therapy.

## 2025-07-10 NOTE — HISTORY OF PRESENT ILLNESS
[de-identified] : LAVINIA TOWNSEND is a 70 year old male who presents to the [facility] for follow up of his lower motor neuron disease, glottic insufficiency- severe, dysphonia and hx of PNA- possible aspiration. I last saw the patient on 7/3/25. He returns for injection laryngoplasty on the right. States voice, breathing, swallow are all the same. Denies fevers/chills.   CT head performed on 12/9/24 and reviewed by myself shows: 1. Age-appropriate involutional changes. No acute intracranial hemorrhage, mass effect, or midline shift. 2. Air-fluid level within the left maxillary sinus, possibly hemorrhage. Non-displaced fracture along the lateral wall of the left maxillary sinus.  Previously reported: difficulty phonating for 2 years. Has been worse past 6 months but since then plateaued. Had recent admission for colitis and hypercarbia resp failure secondary to PNA June 16-28th in [place]. July 28-July 4th [facility]. Previously did voice therapy completing on May 2023. Hx of being  during 9/11. Recent hospitalization did not require intubation. He now does not note normal periods of voicing. He does not note difficulty swallowing. He does note frequent heartburn symptoms - pantoprazole daily He does note difficulty breahting during activity - using Breo Ellipta daily and non invasive ventilator at night since hospitalization. PMH significant for lower motor neuron disease. Sees outside neurologist.  PET (8/15/24): 1. Normal gastric uptake. 2. Uptake previously seen at the bowel has resolved. 3. Stable mildly avid lingular opacity. Clusters of small RIGHT LOWER lobe lung nodules appear largely unchanged since most recent dedicated CT and are metabolically unremarkable. Attention to follow-up. 4. No new suspicious findings otherwise noted  CT chest w/o con performed on 7/22/24 shows: Resolved bilateral lower lobe opacities since 6/28/2024. Persistent clusters of small nodules in the right lower lobe, likely related to a small airways process.  Modified barium swallow performed on 6/21/24 and reviewed by myself shows: No obvious aspiration. No CP bar. No Zenkers. No significant residue.  Prior Pertinent Procedures: 9/12/24: In office bilateral injection laryngoplasty with Restylane; Dr. Champagne 6/19/25: In office bilateral injection laryngoplasty with Restylane; Dr. Champagne 7/3/25: In office bilateral injection laryngoplasty with Restylane; Dr. Champagne

## 2025-07-10 NOTE — PROCEDURE
[FreeTextEntry3] : Restylane injection of bilateral vocal folds: SURGEON: Phillip Champagne MD   Substance Injected: Restylane   Amounts and Location Injected: 0.5 ml injected into the right paraglottic space, 0.2 in left paraglottic space   FINDINGS: Glottic insufficiency   NARRATIVE: The patient was brought to clinic and consented for the procedure. Their nose was topicalized with oxymetazoline nasal spray and 2% lidocaine.   After topicalization, a surgical pause was held to confirm correct patient, procedure, site, allergies, equipment, and position. Using a 27-gauge needle, approximately 2cc of 2% lidocaine in 1:100,000 epinephrine was injected subcutaneously at the thyroid prominence.   A flexible distal-chip video laryngoscope was introduced into the left nare. The scope was positioned above the epiglottis. Approximately 4ml of 4% lidocaine was dripped onto the vocal cords through the working channel of the scope. This produced a strong cough, anesthetizing the vocal folds. After allowing several minutes for analgesia, the 1 ml of Restylane on a 25-gauge needle (that was bent appropriately) was inserted into the skin that had previously been anesthetized just superior to the thyroid notch. The tip of the needle was seen tenting up the mucosa of the petiole just above the anterior commissure. The needle then pierced through this mucosa and was advanced and inserted laterally into the posterior 1/3 of the bilateral vocal folds. The patient was asked to phonate, and they had a squeezed quality as aimed for. The vocal fold had been pushed slightly past medial position as intended. The needle and flexible laryngoscopes were removed. The patient tolerated the procedure well.   DISPOSITION: The patient was discharged with instructions not to eat or drink for about 60 minutes to allow the topical anesthesia to wear off.

## 2025-07-28 NOTE — PROCEDURE
[de-identified] : Stroboscopic Laryngoscopy Procedure Note: Indications: Assess laryngeal biomechanics and vocal fold oscillation. Description of Procedure: Informed consent was verbally obtained from the patient prior to the procedure. The patient was seated in the clinic chair. Topical anesthesia was achieved by first spraying the nasal cavities with 4% lidocaine and nasal decongestant. Findings: Supraglottis: no masses or lesions Glottis:  Vocal cords:                       Right: crisp and shows no lesions or masses                       Left:  crisp and shows no lesions or masses                Mobility:                       Right:  normal                       Left:  normal                Amplitude:                       Right:  normal                       Left:  normal                Closure: complete                Wave symmetry:  unable to determine Subglottis: no masses or lesions within the visualized subglottis. Visualized airway is widely patent.

## 2025-07-28 NOTE — HISTORY OF PRESENT ILLNESS
[de-identified] : LAVINIA TOWNSEND is a 70 year old male who presents to the [facility] for follow up of his lower motor neuron disease, glottic insufficiency- severe, dysphonia and hx of PNA- possible aspiration. I last saw the patient on 7/10/25. Patient was to follow up in clinic in 2 weeks. If no good voice from last  injection would likely recommend voice therapy.  States only within the last 2-3 days there has been slight improvement in voice.  Voice is at 30-40% Denies recent fevers or infections. Breathing is stable, no difficulties swallowing.  CT head performed on 12/9/24 and reviewed by myself shows: 1. Age-appropriate involutional changes. No acute intracranial hemorrhage, mass effect, or midline shift. 2. Air-fluid level within the left maxillary sinus, possibly hemorrhage. Non-displaced fracture along the lateral wall of the left maxillary sinus.  Previously reported: difficulty phonating for 2 years. Has been worse past 6 months but since then plateaued. Had recent admission for colitis and hypercarbia resp failure secondary to PNA June 16-28th in [place]. July 28-July 4th [place] [facility]. Previously did voice therapy completing on May 2023. Hx of being  during 9/11. Recent hospitalization did not require intubation. He now does not note normal periods of voicing. He does not note difficulty swallowing. He does note frequent heartburn symptoms - pantoprazole daily He does note difficulty breahting during activity - using Breo Ellipta daily and non invasive ventilator at night since hospitalization. PMH significant for lower motor neuron disease. Sees outside neurologist.  PET (8/15/24): 1. Normal gastric uptake. 2. Uptake previously seen at the bowel has resolved. 3. Stable mildly avid lingular opacity. Clusters of small RIGHT LOWER lobe lung nodules appear largely unchanged since most recent dedicated CT and are metabolically unremarkable. Attention to follow-up. 4. No new suspicious findings otherwise noted  CT chest w/o con performed on 7/22/24 shows: Resolved bilateral lower lobe opacities since 6/28/2024. Persistent clusters of small nodules in the right lower lobe, likely related to a small airways process.  Modified barium swallow performed on 6/21/24 and reviewed by myself shows: No obvious aspiration. No CP bar. No Zenkers. No significant residue.  Prior Pertinent Procedures: 9/12/24: In office bilateral injection laryngoplasty with Restylane; Dr. Champagne 6/19/25: In office bilateral injection laryngoplasty with Restylane; Dr. Champagne 7/3/25: In office bilateral injection laryngoplasty with Restylane; Dr. Champagne 7/10/25: In office bilateral injection laryngoplasty with Restylane; Dr. Champagne

## 2025-07-28 NOTE — PROCEDURE
[de-identified] : Stroboscopic Laryngoscopy Procedure Note: Indications: Assess laryngeal biomechanics and vocal fold oscillation. Description of Procedure: Informed consent was verbally obtained from the patient prior to the procedure. The patient was seated in the clinic chair. Topical anesthesia was achieved by first spraying the nasal cavities with 4% lidocaine and nasal decongestant. Findings: Supraglottis: no masses or lesions Glottis:  Vocal cords:                       Right: crisp and shows no lesions or masses                       Left:  crisp and shows no lesions or masses                Mobility:                       Right:  normal                       Left:  normal                Amplitude:                       Right:  normal                       Left:  normal                Closure: complete                Wave symmetry:  unable to determine Subglottis: no masses or lesions within the visualized subglottis. Visualized airway is widely patent.

## 2025-07-28 NOTE — HISTORY OF PRESENT ILLNESS
[de-identified] : LAVINIA TOWNSEND is a 70 year old male who presents to the [facility] for follow up of his lower motor neuron disease, glottic insufficiency- severe, dysphonia and hx of PNA- possible aspiration. I last saw the patient on 7/10/25. Patient was to follow up in clinic in 2 weeks. If no good voice from last  injection would likely recommend voice therapy.  States only within the last 2-3 days there has been slight improvement in voice.  Voice is at 30-40% Denies recent fevers or infections. Breathing is stable, no difficulties swallowing.  CT head performed on 12/9/24 and reviewed by myself shows: 1. Age-appropriate involutional changes. No acute intracranial hemorrhage, mass effect, or midline shift. 2. Air-fluid level within the left maxillary sinus, possibly hemorrhage. Non-displaced fracture along the lateral wall of the left maxillary sinus.  Previously reported: difficulty phonating for 2 years. Has been worse past 6 months but since then plateaued. Had recent admission for colitis and hypercarbia resp failure secondary to PNA June 16-28th in [place]. July 28-July 4th [place]. Previously did voice therapy completing on May 2023. Hx of being  during 9/11. Recent hospitalization did not require intubation. He now does not note normal periods of voicing. He does not note difficulty swallowing. He does note frequent heartburn symptoms - pantoprazole daily He does note difficulty breahting during activity - using Breo Ellipta daily and non invasive ventilator at night since hospitalization. PMH significant for lower motor neuron disease. Sees outside neurologist.  PET (8/15/24): 1. Normal gastric uptake. 2. Uptake previously seen at the bowel has resolved. 3. Stable mildly avid lingular opacity. Clusters of small RIGHT LOWER lobe lung nodules appear largely unchanged since most recent dedicated CT and are metabolically unremarkable. Attention to follow-up. 4. No new suspicious findings otherwise noted  CT chest w/o con performed on 7/22/24 shows: Resolved bilateral lower lobe opacities since 6/28/2024. Persistent clusters of small nodules in the right lower lobe, likely related to a small airways process.  Modified barium swallow performed on 6/21/24 and reviewed by myself shows: No obvious aspiration. No CP bar. No Zenkers. No significant residue.  Prior Pertinent Procedures: 9/12/24: In office bilateral injection laryngoplasty with Restylane; Dr. Champagne 6/19/25: In office bilateral injection laryngoplasty with Restylane; Dr. Champagne 7/3/25: In office bilateral injection laryngoplasty with Restylane; Dr. Champagne 7/10/25: In office bilateral injection laryngoplasty with Restylane; Dr. Champagne

## 2025-07-28 NOTE — ASSESSMENT
[FreeTextEntry1] : Assessment/Plan: #1 Lower motor neuron disease #2 Glottic insufficiency #3 Dysphonia #4 History of PNA- possible aspiration  Discussed possibly doing a thyroplasty in future but would not consider until at least 3 months from now to wait for Restylane to dissolve.  However I do not think that that alone would make a huge improvement to his voice and instead expect voice therapy to be more beneficial. As such have ordered for this and will follow up either in 3 months or after completion of voice therapy, whatever happens first.